# Patient Record
Sex: FEMALE | Race: WHITE | NOT HISPANIC OR LATINO | URBAN - METROPOLITAN AREA
[De-identification: names, ages, dates, MRNs, and addresses within clinical notes are randomized per-mention and may not be internally consistent; named-entity substitution may affect disease eponyms.]

---

## 2018-01-31 ENCOUNTER — OFFICE VISIT (OUTPATIENT)
Dept: ALLERGY | Facility: CLINIC | Age: 53
End: 2018-01-31
Payer: MEDICAID

## 2018-01-31 VITALS
HEIGHT: 66 IN | DIASTOLIC BLOOD PRESSURE: 110 MMHG | BODY MASS INDEX: 40.84 KG/M2 | SYSTOLIC BLOOD PRESSURE: 188 MMHG | WEIGHT: 254.13 LBS

## 2018-01-31 DIAGNOSIS — L29.9 PRURITUS: ICD-10-CM

## 2018-01-31 DIAGNOSIS — L08.9 SKIN INFECTION: ICD-10-CM

## 2018-01-31 DIAGNOSIS — R21 RASH AND NONSPECIFIC SKIN ERUPTION: Primary | ICD-10-CM

## 2018-01-31 DIAGNOSIS — I10 ELEVATED BLOOD PRESSURE READING IN OFFICE WITH DIAGNOSIS OF HYPERTENSION: ICD-10-CM

## 2018-01-31 PROCEDURE — 99214 OFFICE O/P EST MOD 30 MIN: CPT | Mod: ,,, | Performed by: ALLERGY & IMMUNOLOGY

## 2018-01-31 PROCEDURE — 3008F BODY MASS INDEX DOCD: CPT | Mod: ,,, | Performed by: ALLERGY & IMMUNOLOGY

## 2018-01-31 RX ORDER — TRIAMCINOLONE ACETONIDE 0.25 MG/G
OINTMENT TOPICAL 2 TIMES DAILY
Qty: 454 G | Refills: 1 | Status: SHIPPED | OUTPATIENT
Start: 2018-01-31 | End: 2018-03-07 | Stop reason: ALTCHOICE

## 2018-01-31 RX ORDER — ASPIRIN 300 MG/1
300 SUPPOSITORY RECTAL EVERY 6 HOURS PRN
COMMUNITY

## 2018-01-31 RX ORDER — CLINDAMYCIN HYDROCHLORIDE 300 MG/1
300 CAPSULE ORAL EVERY 8 HOURS
Qty: 30 CAPSULE | Refills: 0 | Status: SHIPPED | OUTPATIENT
Start: 2018-01-31 | End: 2018-02-10

## 2018-01-31 RX ORDER — FLUCONAZOLE 150 MG/1
150 TABLET ORAL DAILY
Qty: 1 TABLET | Refills: 0 | Status: SHIPPED | OUTPATIENT
Start: 2018-01-31 | End: 2018-01-31 | Stop reason: ALTCHOICE

## 2018-01-31 RX ORDER — FLUCONAZOLE 100 MG/1
100 TABLET ORAL DAILY
Qty: 10 TABLET | Refills: 0 | Status: SHIPPED | OUTPATIENT
Start: 2018-01-31 | End: 2018-02-10

## 2018-01-31 RX ORDER — LISINOPRIL AND HYDROCHLOROTHIAZIDE 20; 25 MG/1; MG/1
1 TABLET ORAL DAILY
COMMUNITY

## 2018-01-31 RX ORDER — MINERAL OIL
180 ENEMA (ML) RECTAL DAILY
Qty: 30 TABLET | Refills: 0 | Status: SHIPPED | OUTPATIENT
Start: 2018-01-31 | End: 2018-03-07 | Stop reason: SDUPTHER

## 2018-01-31 RX ORDER — DOXEPIN HYDROCHLORIDE 25 MG/1
25 CAPSULE ORAL NIGHTLY
Qty: 30 CAPSULE | Refills: 11 | Status: SHIPPED | OUTPATIENT
Start: 2018-01-31 | End: 2019-01-31

## 2018-01-31 NOTE — PROGRESS NOTES
"Subjective:       Patient ID: Candida Limon is a 52 y.o. female.    Chief Complaint: Rash    HPI     Pt presents from Jan. Of 2017. At that time, she was seen for cough and food allergy. Now pt presents with rash on trunk and arms.   Her rash started in early jan.   It has gotten progressively worse.   It is oozing. Yellow liquid.   Very pruritic.   Mainly on abdomen, back, sides, and neck.     Hasn't taken lisinopril since Sunday. She isn't sure if her rash is better, but her cough has been better.   She doesn't endorse dm.   Not been biopsied. Worse with heat.     Review of Systems      General: neg unexpected weight changes, fevers, chills, night sweats, malaise  HEENT: see hpi, Neg eye pain, vision changes, ear drainage, nose bleeds, throat tightness, sores in the mouth  CV: Neg chest pain, palpitations, swelling  Resp: see hpi, neg shortness of breath, hemoptysis, cough  GI: see hpi, neg dysphagia, night abdominal pain, reflux, chronic diarrhea, chronic constipation  Derm: See Hpi,  neg flushing  Mu/sk: Neg joint pain, joint swelling   Psych: Neg anxiety  neuro: neg chronic headaches, muscle weakness  Endo: neg heat/cold intolerance, chronic fatigue    Objective:       Vitals:    01/31/18 1426 01/31/18 1427 01/31/18 1436 01/31/18 1437   BP: (!) 180/102 (!) 158/102 (!) 174/110 (!) 188/110   Weight: 115.3 kg (254 lb 1.6 oz)      Height: 5' 6" (1.676 m)          Physical Exam      General: no acute distress, well developed well nourished   HEENT:   Head:normocephalic atraumatic  Eyes: ROSA M, EOMI, Neg injection, scleral icterus, or conjunctival papillary hypertrophy.  Skin: skin is oozing right sided plaque dark plaque, multiple blisters on hands and wrists, erythema and excoriations. Lichenified right sided plaque large, with smaller one.   Lymph: neg supraclavicular, axillary     Assessment:       1. Rash and nonspecific skin eruption    2. Pruritus    3. Skin infection    4. Elevated blood pressure reading " in office with diagnosis of hypertension        Plan:       Rash and nonspecific skin eruption  -     Discontinue: fluconazole (DIFLUCAN) 150 MG Tab; Take 1 tablet (150 mg total) by mouth once daily.  Dispense: 1 tablet; Refill: 0  -     doxepin (SINEQUAN) 25 MG capsule; Take 1 capsule (25 mg total) by mouth every evening.  Dispense: 30 capsule; Refill: 11  -     camphor-menthol 0.5-0.5% (SARNA) lotion; Apply topically as needed for Itching.; Refill: 0  -     clindamycin (CLEOCIN) 300 MG capsule; Take 1 capsule (300 mg total) by mouth every 8 (eight) hours.  Dispense: 30 capsule; Refill: 0  -     fexofenadine (ALLEGRA) 180 MG tablet; Take 1 tablet (180 mg total) by mouth once daily.  Dispense: 30 tablet; Refill: 0  -     fluconazole (DIFLUCAN) 100 MG tablet; Take 1 tablet (100 mg total) by mouth once daily.  Dispense: 10 tablet; Refill: 0  -     triamcinolone acetonide 0.025% (KENALOG) 0.025 % Oint; Apply topically 2 (two) times daily.  Dispense: 454 g; Refill: 1    Pruritus  -     doxepin (SINEQUAN) 25 MG capsule; Take 1 capsule (25 mg total) by mouth every evening.  Dispense: 30 capsule; Refill: 11  -     camphor-menthol 0.5-0.5% (SARNA) lotion; Apply topically as needed for Itching.; Refill: 0  -     triamcinolone acetonide 0.025% (KENALOG) 0.025 % Oint; Apply topically 2 (two) times daily.  Dispense: 454 g; Refill: 1    Skin infection  -     clindamycin (CLEOCIN) 300 MG capsule; Take 1 capsule (300 mg total) by mouth every 8 (eight) hours.  Dispense: 30 capsule; Refill: 0    Elevated blood pressure reading in office with diagnosis of hypertension      Stop lisinopril. Discussed with patient that I would like her to stop lisinopril and call PCP for an alternative medication to control her blood pressure. Discussed concern for potential CVA if her pressure is not better controlled. Pt understood.   No major changes has the patient made and no other ingestants other than lisinopril.  If not better with above  regimen, consider biopsy.  lisha cabezasn, allegra 1 pill bid.   OG , antifungal, and abx. Doxepin for breakthrough.     Pt to follow up in 2 weeks for re evaluation.         Lindsay Anthony M.D.  Allergy/Immunology  [facility]   [phone] phone  [phone] fax

## 2018-01-31 NOTE — PATIENT INSTRUCTIONS
Skin plan:    Oral fluconazole for 10 days 1 pill once per day  Clindamycin 300 mg tab 1 pill three times per day for 10 days.   sarna- cooling /tingling apply anywhere.   Triamcinolone twice per day on affected area.     Apply vaseline moisturizer multiple times per day on the itchy spots.     Allegra as needed for itch.     Take doxepin only at night for bedtime itch. 1 pill at night.     Stop lisinopril.

## 2018-03-07 ENCOUNTER — OFFICE VISIT (OUTPATIENT)
Dept: ALLERGY | Facility: CLINIC | Age: 53
End: 2018-03-07
Payer: MEDICAID

## 2018-03-07 VITALS
HEART RATE: 97 BPM | WEIGHT: 256.13 LBS | DIASTOLIC BLOOD PRESSURE: 92 MMHG | SYSTOLIC BLOOD PRESSURE: 148 MMHG | BODY MASS INDEX: 41.16 KG/M2 | HEIGHT: 66 IN | OXYGEN SATURATION: 98 %

## 2018-03-07 DIAGNOSIS — R21 RASH AND NONSPECIFIC SKIN ERUPTION: Primary | ICD-10-CM

## 2018-03-07 DIAGNOSIS — I10 ELEVATED BLOOD PRESSURE READING IN OFFICE WITH DIAGNOSIS OF HYPERTENSION: ICD-10-CM

## 2018-03-07 DIAGNOSIS — B86 SCABIES: ICD-10-CM

## 2018-03-07 DIAGNOSIS — I10 BENIGN ESSENTIAL HTN: ICD-10-CM

## 2018-03-07 PROCEDURE — 99214 OFFICE O/P EST MOD 30 MIN: CPT | Mod: ,,, | Performed by: ALLERGY & IMMUNOLOGY

## 2018-03-07 RX ORDER — FLUCONAZOLE 100 MG/1
100 TABLET ORAL DAILY
Qty: 5 TABLET | Refills: 0 | Status: SHIPPED | OUTPATIENT
Start: 2018-03-07 | End: 2018-03-12

## 2018-03-07 RX ORDER — PERMETHRIN 50 MG/G
CREAM TOPICAL ONCE
Qty: 60 G | Refills: 1 | Status: SHIPPED | OUTPATIENT
Start: 2018-03-07 | End: 2018-05-09 | Stop reason: SDUPTHER

## 2018-03-07 RX ORDER — TRIAMCINOLONE ACETONIDE 1 MG/G
OINTMENT TOPICAL 2 TIMES DAILY
Qty: 453 G | Refills: 1 | Status: SHIPPED | OUTPATIENT
Start: 2018-03-07 | End: 2018-03-17

## 2018-03-07 RX ORDER — MINERAL OIL
180 ENEMA (ML) RECTAL DAILY
Qty: 30 TABLET | Refills: 3 | Status: SHIPPED | OUTPATIENT
Start: 2018-03-07 | End: 2019-03-07

## 2018-03-07 NOTE — PATIENT INSTRUCTIONS
Triamcinolone:  Apply to rash twice daily   Then put vaseline on top    Diflucan: take 1 pill once per week    permethrin cream  Apply neck down leave on 8 hours and wash off  Wash sheets in hot water

## 2018-03-07 NOTE — PROGRESS NOTES
"Subjective:       Patient ID: Candida Limon is a 52 y.o. female.    Chief Complaint: Rash (Follow Up- Pt states she has gotten better since her last visit)    HPI     Pt presents from Jan. For rash.     At the last visit she was given doxepin for itch, sarna, cleocin for infection, allegra for itch, diglucan and triancinolone ointment.   Condition: improved but not resolved  Sx: pruritus , bumps on the hands, scale on her left side and ankle  Current tx: triamcinolone, bleach on her spots- not diluted, pours from a cap and places drops on her bumps  asso sx: none     She had stopped her lisinopril due to cough.   Her cough is currently: present 15 mins after lisinopril.   Pt has made an appt to get bp med changed.   She is taking it every other day or less.  Discussed that she will need to switched based upon cough.       Review of Systems      General: neg unexpected weight changes, fevers, chills, night sweats, malaise  HEENT: see hpi, Neg eye pain, vision changes, ear drainage, nose bleeds, throat tightness, sores in the mouth  CV: Neg chest pain, palpitations, swelling  Resp: see hpi, neg shortness of breath, hemoptysis  GI: see hpi, neg dysphagia, night abdominal pain, reflux, chronic diarrhea, chronic constipation  Derm: See Hpi, neg flushing  Mu/sk: Neg joint pain, joint swelling   Psych: Neg anxiety  neuro: neg chronic headaches, muscle weakness  Endo: neg heat/cold intolerance, chronic fatigue    Objective:       Vitals:    03/07/18 0847   BP: (!) 148/92   Pulse: 97   SpO2: 98%   Weight: 116.2 kg (256 lb 1.6 oz)   Height: 5' 6" (1.676 m)       Physical Exam      General: no acute distress, well developed well nourished   Skin: hyperpigementation bilateral sides, ankle, scale on her left side, right side is smooth with some scale and post inflammatory hyperpigmentation. Right hand has papule could be scabies on her hands as there may be burrows between her fingers on the right hand. Band is neg and " ankle with bumps as well on the right ankle.        Assessment:       1. Rash and nonspecific skin eruption    2. Elevated blood pressure reading in office with diagnosis of hypertension    3. Benign essential HTN    4. Scabies        Plan:       Rash and nonspecific skin eruption  -     triamcinolone acetonide 0.1% (KENALOG) 0.1 % ointment; Apply topically 2 (two) times daily.  Dispense: 453 g; Refill: 1  -     fexofenadine (ALLEGRA) 180 MG tablet; Take 1 tablet (180 mg total) by mouth once daily.  Dispense: 30 tablet; Refill: 3  -     fluconazole (DIFLUCAN) 100 MG tablet; Take 1 tablet (100 mg total) by mouth once daily.  Dispense: 5 tablet; Refill: 0    Elevated blood pressure reading in office with diagnosis of hypertension    Benign essential HTN    Scabies  -     permethrin (ELIMITE) 5 % cream; Apply topically once. Neck down  Dispense: 60 g; Refill: 1    Pt wanted to discuss her rash, scabies, look at pictures, and asked questions about mrsa and discussed her daughter having mrsa and how she is worried staying at the [facility] may have affected her and may have exposed her to scabies or mrsa.   We discuss face to face about her rash, her treatment, mrsa, scabies and their treatments face to face counselling > 50% for > 35 mins about these issues.     Follow up if needed and not better, if improved, then prn. Instruction was written and printed for patient how to use her medications. Increased steroid strength, refilled medications and treated for scabies.

## 2018-05-09 DIAGNOSIS — B86 SCABIES: ICD-10-CM

## 2018-05-09 RX ORDER — PERMETHRIN 50 MG/G
CREAM TOPICAL
Qty: 60 G | Refills: 0 | Status: SHIPPED | OUTPATIENT
Start: 2018-05-09